# Patient Record
(demographics unavailable — no encounter records)

---

## 2020-10-22 NOTE — REPVR
PROCEDURE INFORMATION: 

Exam: MR Head Without Contrast 

Exam date and time: 10/22/2020 7:44 PM 

Age: 60 years old 

Clinical indication: Dizziness; Additional info: CVA 



TECHNIQUE: 

Imaging protocol: MR of the head without contrast. 



COMPARISON: 

CT Head without contrast 10/22/2020 4:20 PM 



FINDINGS: 

Brain: No restricted diffusion to suggest acute infarction. No acute 

intracranial hemorrhage mass or mass effect. No prior microhemorrhages. No 

white matter lesions. 

Cerebral ventricles: No ventriculomegaly. 

Bones/joints: Unremarkable. 

Paranasal sinuses: Normal as visualized. No acute sinusitis. 

Mastoid air cells: Normal as visualized. No mastoid effusion. 

Orbits: Unremarkable. 

Soft tissues: Unremarkable. 



IMPRESSION: 

No acute cerebral infarction or intracranial hemorrhage. 



Electronically signed by: Minerva Ramirez On 10/22/2020  20:36:59 PM

## 2020-10-22 NOTE — IPNPDOC
Text Note


Date of Service


The patient was seen on 10/22/20.





NOTE


TIME OF SERVICE 920PM





Mr. Patton is a 60 yr old w a hx of pre-DM, neuropathy, dyslipidemia, PAULINE and 

HTN who presented w c/o dizziness, sensation that the room is spinning, unsteady

gait and vomiting that begun today. Orthostats were positive, imaging studies of

the brain were unremarkable and his symptoms didnt improve w meclizine or IVF. 

He will be admitted for observation, while we titrate his meclizine and pending 

PT evaluation. 





Rest per 's H&P





VS,Fishbone, I+O


VS, Fishbone, I+O


Laboratory Tests


10/22/20 15:56








10/22/20 17:13











Vital Signs








  Date Time  Temp Pulse Resp B/P (MAP) Pulse Ox O2 Delivery O2 Flow Rate FiO2


 


10/22/20 17:30  83 18 131/74 (93) 96 Room Air  


 


10/22/20 15:20 97.0       

















JOSE SUERO MD                Oct 22, 2020 19:55

## 2020-10-22 NOTE — REPVR
PROCEDURE INFORMATION: 

Exam: XR Chest, 2 Views 

Exam date and time: 10/22/2020 4:16 PM 

Age: 60 years old 

Clinical indication: Other: Dizziness 



TECHNIQUE: 

Imaging protocol: XR of the chest 

Views: 2 views. 



COMPARISON: 

No relevant prior studies available. 



FINDINGS: 

Lungs: Unremarkable. No consolidation. 

Pleural space: Unremarkable. No pleural effusion. No pneumothorax. 

Heart/Mediastinum: Unremarkable. No cardiomegaly. 

Bones/joints: Unremarkable. 



IMPRESSION: 

No acute findings. 



Electronically signed by: Amor Flores On 10/22/2020  16:36:42 PM

## 2020-10-22 NOTE — REPVR
PROCEDURE INFORMATION: 

Exam: MR Angiogram Head Without Contrast, Arteries 

Exam date and time: 10/22/2020 7:44 PM 

Age: 60 years old 

Clinical indication: Dizziness and giddiness and vertigo; Additional info: CVA 



TECHNIQUE: 

Imaging protocol: MR angiogram head without contrast. Exam focused on the 

arteries. 



COMPARISON: 

CT Head without contrast 10/22/2020 4:20 PM 



FINDINGS: 



ANTERIOR CIRCULATION: 

Right internal carotid artery: Intracranial segment is patent with no 

significant stenosis. No aneurysm. 

Right middle cerebral artery: No occlusion or significant stenosis. No 

aneurysm. 

Right anterior cerebral artery: No occlusion or significant stenosis. No 

aneurysm. 



Left internal carotid artery: Intracranial segment is patent with no 

significant stenosis. No aneurysm. 

Left middle cerebral artery: No occlusion or significant stenosis. No aneurysm. 

Left anterior cerebral artery: No occlusion or significant stenosis. No 

aneurysm. 



POSTERIOR CIRCULATION: 

Right vertebral artery: No occlusion or significant stenosis. No aneurysm. 

Left vertebral artery:  Dominant left vertebral artery.

Basilar artery: No occlusion or significant stenosis. No aneurysm. 

Right posterior cerebral artery: No occlusion or significant stenosis. No 

aneurysm. 

Left posterior cerebral artery: No occlusion or significant stenosis. No 

aneurysm. 



IMPRESSION: 

No stenosis or occlusion. 



Electronically signed by: Minerva Ramirez On 10/22/2020  20:34:54 PM

## 2020-10-22 NOTE — REPVR
PROCEDURE INFORMATION: 

Exam: CT Head Without Contrast 

Exam date and time: 10/22/2020 4:16 PM 

Age: 60 years old 

Clinical indication: Dizziness 



TECHNIQUE: 

Imaging protocol: Computed tomography of the head without contrast. 

Radiation optimization: All CT scans at this facility use at least one of these 

dose optimization techniques: automated exposure control; mA and/or kV 

adjustment per patient size (includes targeted exams where dose is matched to 

clinical indication); or iterative reconstruction. 



COMPARISON: 

No relevant prior studies available. 



FINDINGS: 

Brain: No acute intracranial hemorrhage, cerebral edema, or midline shift. 

Cerebral ventricles: No hydrocephalus. 

Bones/joints: No acute fracture. 

Paranasal sinuses: There is no acute sinusitis. 

Mastoid air cells: Visualized mastoid air cells are well aerated. 

Orbital cavity: Unremarkable as visualized. 

Soft tissues: Unremarkable. 



IMPRESSION: 

No acute intracranial abnormality. 



Electronically signed by: Amor Flores On 10/22/2020  16:35:38 PM

## 2020-10-22 NOTE — HPEPDOC
[facility] Medical History & Physical


Date of Admission


Oct 22, 2020


Date of Service:  Oct 22, 2020


Attending Physician:  JOSE SUERO MD





History and Physical


CHIEF COMPLAINT: Dizziness 





HISTORY OF PRESENT ILLNESS: He is a 60-year-old male who presented to [facility] emergency department with complaint of dizziness. Patient states 

that around 7:30 this morning he had developed dizziness in which she felt the 

room was spinning. He stated that he sat down to see if it will go away however 

continued. He went to the bathroom where he developed some nausea and vomiting. 

He states that he has been unable to keep anything down due to the dizziness and

nausea. He denies any fevers chills abdominal pain diarrhea or constipation. He 

denies any changes in his gait with exception to feeling off balance due to the 

dizziness. He denies any weakness in any of his extremities. He denies any slurr

ing of his speech. He denies any confusion. Patient does admit to a history of 

migraines. He states that he sometimes develops a burning feeling in his left 

eye. He has been evaluated by ophthalmology in the past and was told he has dry 

eyes. Currently he denies any issue with his left eye. He denies any diploplia. 

He does state that when he was dizzy it was hard to focus but denies any 

limitations to sight currently. 





In the ER the patient was found to be vitally stable. He had positive orthos

tatic vital signs. He was given a 1L bolus. Additionally 50mg meclizine with 

some improvement in dizziness. Brain MRI and head CT found to be negative for 

any acute findings. Hospitalist service was consulted and the patient was 

admitted for further evaluation and management 





PAST MEDICAL HISTORY:


1. Hyperlipidemia 


2. Pre-Diabetes


3. Hypertension 


4. Obstructive Sleep Apnea noncompliant with CPAP


5. Chronic Lower Back Pain 


6. Migraine Headaches





PAST SURGICAL HISTORY:


1. Dental Surgery





SOCIAL HISTORY: Patient lives at home with his wife. He is a retired from the 

army. He is a former smoker and quit smoking 20 years ago. He denies any IV or 

illicit drug use. He states that he does not drink alcohol





FAMILY HISTORY: Patients father is . He had a history of diabetes 

mellitus type 2. His mother is alive and has a history of ASCVD with stroke 





ALLERGIES: Please see below.





REVIEW OF SYSTEMS:


CONSTITUTIONAL: Denies fevers. Admits to occasional chills. Denies unintentional

weight loss or weight gain. Denies night sweats


HEENT: Admits to occasional left eye dryness/burning. Admits to history of 

migraines. Denies dysphagia or odynophagia


CARDIOVASCULAR: Denies chest pain, palpitations or feelings of the heart racing


RESPIRATORY: Denies shortness of breath. Denies cough. Denies wheezing 


GASTROINTESTINAL: Admits to nausea and vomiting. Denies abdominal pain. Denies 

constipation or diarrhea 


GENITOURINARY: Denies dysuria, increased frequency or urgency


SKIN: Denies rashes or lesions 


MUSCULOSKELETAL: Denies muscle weakness. States he has difficulty with his 

balance when he gets dizzy spells 


NEUROLOGICAL: Admits to feeling dizzy. Denies changes in speech. Denies muscle 

weakness. Denies facial droop. Denies confusion 


PSYCHIATRIC: Denies depression or anxiety 


ENDOCRINE: Denies heat intolerance or cold intolerance 


HEMATOLOGIC/LYMPHATIC: Denies history of easy bruising or bleeding. Denies 

history of DVT or PE 





HOME MEDICATIONS: Please see below. 





PHYSICAL EXAMINATION:


VITAL SIGNS: Temperature 97.0, pulse 81, respiratory rate 18, blood pressure 

150/75, pulse oximetry 95% on room air.


GENERAL APPEARANCE: Patient is awake, alert, and oriented. He is lying in bed 

comfortably. He does not appear to be in any acute distress 


HEENT: Atraumatic, normocephalic. Eyes are nonicteric. There is no scleral 

ejection. Mucous membranes are slightly dry


CARDIOVASCULAR:. Normal S1, S2, regular rate and rhythm. No clicks, rubs or 

murmurs.


LUNGS:. Clear vesicular breath sounds bilaterally. Good respiratory effort. No 

wheezes, rhonchi or rales


ABDOMEN:. Soft, nondistended, nontender, no rebound tenderness or guarding. 

Normoactive bowel sounds.


MUSCULOSKELETAL: 5 out of 5 muscle strength testing in bilateral upper and lower

extremities


EXTREMITIES: No edema. Full equal pulses bilaterally in upper and lower 

extremities


NEUROLOGICAL: No focal neurological deficits. CN II-XII grossly intact. 

Cerebellar testing is normal. No nystagmus noted.


PSYCHIATRIC: Mood and affect appear appropriate for situation.





LABORATORY DATA: See below.





IMAGING: 





PROCEDURE INFORMATION: 


Exam: CT Head Without Contrast 


Exam date and time: 10/22/2020 4:16 PM 


Age: 60 years old 


Clinical indication: Dizziness 





TECHNIQUE: 


Imaging protocol: Computed tomography of the head without contrast. 


Radiation optimization: All CT scans at this facility use at least one of these 


dose optimization techniques: automated exposure control; mA and/or kV 


adjustment per patient size (includes targeted exams where dose is matched to 


clinical indication); or iterative reconstruction. 





COMPARISON: 


No relevant prior studies available. 





FINDINGS: 


Brain: No acute intracranial hemorrhage, cerebral edema, or midline shift. 


Cerebral ventricles: No hydrocephalus. 


Bones/joints: No acute fracture. 


Paranasal sinuses: There is no acute sinusitis. 


Mastoid air cells: Visualized mastoid air cells are well aerated. 


Orbital cavity: Unremarkable as visualized. 


Soft tissues: Unremarkable. 





IMPRESSION: 


No acute intracranial abnormality. 





Electronically signed by: Amor Flores On 10/22/2020  16:35:38 PM





PROCEDURE INFORMATION: 


Exam: XR Chest, 2 Views 


Exam date and time: 10/22/2020 4:16 PM 


Age: 60 years old 


Clinical indication: Other: Dizziness 





TECHNIQUE: 


Imaging protocol: XR of the chest 


Views: 2 views. 





COMPARISON: 


No relevant prior studies available. 





FINDINGS: 


Lungs: Unremarkable. No consolidation. 


Pleural space: Unremarkable. No pleural effusion. No pneumothorax. 


Heart/Mediastinum: Unremarkable. No cardiomegaly. 


Bones/joints: Unremarkable. 





IMPRESSION: 


No acute findings. 





Electronically signed by: Amor Flores On 10/22/2020  16:36:42 PM





PROCEDURE INFORMATION: 


Exam: MR Head Without Contrast 


Exam date and time: 10/22/2020 7:44 PM 


Age: 60 years old 


Clinical indication: Dizziness; Additional info: CVA 





TECHNIQUE: 


Imaging protocol: MR of the head without contrast. 





COMPARISON: 


CT Head without contrast 10/22/2020 4:20 PM 





FINDINGS: 


Brain: No restricted diffusion to suggest acute infarction. No acute 


intracranial hemorrhage mass or mass effect. No prior microhemorrhages. No 


white matter lesions. 


Cerebral ventricles: No ventriculomegaly. 


Bones/joints: Unremarkable. 


Paranasal sinuses: Normal as visualized. No acute sinusitis. 


Mastoid air cells: Normal as visualized. No mastoid effusion. 


Orbits: Unremarkable. 


Soft tissues: Unremarkable. 





IMPRESSION: 


No acute cerebral infarction or intracranial hemorrhage. 





Electronically signed by: Minerva Ramirez On 10/22/2020  20:36:59 PM





PROCEDURE INFORMATION: 


Exam: MR Angiogram Head Without Contrast, Arteries 


Exam date and time: 10/22/2020 7:44 PM 


Age: 60 years old 


Clinical indication: Dizziness and giddiness and vertigo; Additional info: CVA 





TECHNIQUE: 


Imaging protocol: MR angiogram head without contrast. Exam focused on the 


arteries. 





COMPARISON: 


CT Head without contrast 10/22/2020 4:20 PM 





FINDINGS: 





ANTERIOR CIRCULATION: 


Right internal carotid artery: Intracranial segment is patent with no 


significant stenosis. No aneurysm. 


Right middle cerebral artery: No occlusion or significant stenosis. No 


aneurysm. 


Right anterior cerebral artery: No occlusion or significant stenosis. No 


aneurysm. 





Left internal carotid artery: Intracranial segment is patent with no 


significant stenosis. No aneurysm. 


Left middle cerebral artery: No occlusion or significant stenosis. No aneurysm. 


Left anterior cerebral artery: No occlusion or significant stenosis. No 


aneurysm. 





POSTERIOR CIRCULATION: 


Right vertebral artery: No occlusion or significant stenosis. No aneurysm. 


Left vertebral artery:  Dominant left vertebral artery.


Basilar artery: No occlusion or significant stenosis. No aneurysm. 


Right posterior cerebral artery: No occlusion or significant stenosis. No 


aneurysm. 


Left posterior cerebral artery: No occlusion or significant stenosis. No 


aneurysm. 





IMPRESSION: 


No stenosis or occlusion. 





Electronically signed by: Minerva Ramirez On 10/22/2020  20:34:54 PM








MICROBIOLOGY: Please see below. 





ASSESSMENT: Patient is a 60-year-old male who presented to [facility] emergency department complaining of dizziness that started this morning. 

He received CT imaging of the head and brain MRI, which was negative. Patient 

was given meclizine for suspected vertigo.


.


PLAN:


1. Dizziness likely 2/2 Vertigo/BPPV


   -Patient presented with complaint of dizziness that started suddenly this 

morning. Developed nausea and vomiting. Denies any auditory changes. 


   -DDX includes: BPPV, Vertebrobasilar insufficiency, vestibular 

migraine/vertebrobasilar migraine, CVA.


   -CT imaging and Brain MRI completed. No acute findings. Patients symptoms 

likely not 2/2 CVA or posterior circulation insufficiency. Likely 2/2 BPPV


   -Patient given Meclizine in ER.


   -Home medications reviewed. No medications with associated ototoxicity. 


   -Will continue Meclizine 


   -Zofran PRN for nausea and vomiting 


   -Will monitor electrolytes due to ongoing nausea/vomiting





2. Orthostatic Hypotension 


   -Patient noted to have positive orthostatic vital signs in the ED. Likely 2/2

to dehydration from nausea/vomiting and decreased oral intake 


   -IV bolus given in ER. Will run maintenance fluids overnight until able to 

tolerate PO intake 





3. Hypertension 


   -On no medications outpatient. If persistent will start Norvasc 5 mg 





4. PAULINE 


   -Patient has PAULINE. He has recently been noncompliant with his CPAP. 


   -Oxygen Therapy orders for O2 sats 88-92% 





5. GERD 


   -Continue Prilosec





6. Left eye burning/dry eyes 


   -Patient has history of left eye burning. States he was told by opthomologist

that he has dry eyes. Currently no burning. No scleral injection. 


   -Artifical Tears PRN for left eye dryness 





7. Hyperlipidemia 


   -Continue Atorvastatin 





8. Chronic Lower Back Pain 


   -Continue Gabapentin 





9. DVT Prophylaxis 


   -Heparin SQ





Vital Signs





Vital Signs








  Date Time  Temp Pulse Resp B/P (MAP) Pulse Ox O2 Delivery O2 Flow Rate FiO2


 


10/22/20 18:45  87 18 150/79 (102) 97 Room Air  


 


10/22/20 15:20 97.0       











Laboratory Data


Labs 24H


Laboratory Tests 2


10/22/20 15:56: 


Immature Granulocyte % (Auto) 0.4, Neutrophils (%) (Auto) 89.7H, Lymphocytes (%)

(Auto) 5.8L, Monocytes (%) (Auto) 3.8, Eosinophils (%) (Auto) 0.1, Basophils (%)

(Auto) 0.2, Neutrophils # (Auto) 12.2H, Lymphocytes # (Auto) 0.8L, Monocytes # 

(Auto) 0.5, Eosinophils # (Auto) 0.0, Basophils # (Auto) 0.0, Nucleated Red 

Blood Cells % (auto) 0.0, Prothrombin Time 12.5, Prothromb Time International 

Ratio 0.92, Activated Partial Thromboplast Time 23.5L


10/22/20 17:13: 


Anion Gap 4L, Glomerular Filtration Rate > 60.0, Calcium Level 8.7L, Total 

Bilirubin 0.5, Aspartate Amino Transf (AST/SGOT) 17, Alanine Aminotransferase 

(ALT/SGPT) 28, Alkaline Phosphatase 104, Total Creatine Kinase 133, Creatine 

Kinase MB 2.0, Creatine Kinase MB Relative Index 1.50, Troponin I < 0.02, Total 

Protein 7.0, Albumin 3.7, Albumin/Globulin Ratio 1.1, Thyroid Stimulating 

Hormone (TSH) 0.598, Free Thyroxine 1.12


CBC/BMP


Laboratory Tests


10/22/20 15:56








10/22/20 17:13











Home Medications


Scheduled


Aspirin (Aspirin EC) 81 Mg Tablet.dr, 81 MG PO QHS


Atorvastatin Calcium (Atorvastatin Calcium) 80 Mg Tablet, 80 MG PO QPM


Gabapentin (Gabapentin) 600 Mg Tablet, 600 MG PO QHS


Metformin HCl (Metformin HCl ER) 500 Mg Tab.er.24h, 1,000 MG PO QPM





Scheduled PRN


Omeprazole (Omeprazole) 20 Mg Capsule.dr, 20 MG PO DAILY PRN for HEARTBURN





Allergies


Coded Allergies:  


     No Known Allergies (Unverified , 10/22/20)





A-FIB/CHADSVASC


A-FIB History


Current/History of A-Fib/PAF?:  No





GME ATTESTATION


GME ATTESTATION


My faculty preceptor for this patient encounter was physically present during 

the encounter and was fully available. All aspects of the patient interview, 

examination, medical decision making process, and medical care plan development 

were reviewed and approved by the faculty preceptor. The faculty preceptor is 

aware and concurs with the plan as stated in the body of this note and will 

attest to such by his/her cosignature.





ATTENDING NOTE


I reviewed and agree with the H&P as documented. Please see my addendum from 

10/22/20











SILKE ZALDIVAR DO            Oct 22, 2020 22:26


JOSE SUERO MD                Oct 23, 2020 03:40

## 2020-10-23 NOTE — ECGEPIP
[facility] - ED

                                       

                                       Test Date:    2020-10-22

Pat Name:     KEVYN SANCHEZ           Department:   

Patient ID:   [id number]                 Room:         -

Gender:       Male                     Technician:   JLIONEL

:          1960               Requested By: KATTY MAHMOOD

Order Number: [id number]     Reading MD:   Ximena Rankin

                                 Measurements

Intervals                              Axis          

Rate:         84                       P:            40

MO:           127                      QRS:          44

QRSD:         94                       T:            57

QT:           385                                    

QTc:          456                                    

                           Interpretive Statements

SINUS RHYTHM

LOW VOLTAGE LIMB 

NSTTW abnormalities

NO PRIOR

Electronically Signed on 10- 20:22:26 EDT by Ximena Rankin

## 2020-10-23 NOTE — DS.PDOC
Discharge Summary


General


Date of Admission


Oct 22, 2020 at 19:49


Date of Discharge


10/23/20


Attending Physician:  Paula Hernandez MD





Discharge Summary


HISTORY OF PRESENT ILLNESS: He is a 60-year-old male who presented to [facility] emergency department with complaint of dizziness. Patient states 

that around 7:30 this morning he had developed dizziness in which she felt the 

room was spinning. He stated that he sat down to see if it will go away however 

continued. He went to the bathroom where he developed some nausea and vomiting. 

He states that he has been unable to keep anything down due to the dizziness and

nausea. He denies any fevers chills abdominal pain diarrhea or constipation. He 

denies any changes in his gait with exception to feeling off balance due to the 

dizziness. He denies any weakness in any of his extremities. He denies any 

slurring of his speech. He denies any confusion. Patient does admit to a history

of migraines. He states that he sometimes develops a burning feeling in his left

eye. He has been evaluated by ophthalmology in the past and was told he has dry 

eyes. Currently he denies any issue with his left eye. He denies any diploplia. 

He does state that when he was dizzy it was hard to focus but denies any 

limitations to sight currently. 





In the ER the patient was found to be vitally stable. He had positive 

orthostatic vital signs. He was given a 1L bolus. Additionally 50mg meclizine 

with some improvement in dizziness. Brain MRI and head CT found to be negative 

for any acute findings. Hospitalist service was consulted and the patient was 

admitted for further evaluation and management 





HOSPITAL COURSE: 


MRA neg. Patient was further evaluated by physical therapy who performed 

vestibular therapy on him with minor improvement. He was started on meclizine 50

mg twice a day which did help his dizziness. Upon recommendation by physical 

therapy he was recommended to follow-up with her outpatient clinic to continue 

vestibular therapy. The patient also complained of a headache and was advised 

upon returning home to take sumatriptan which is his home medication. By midday 

on 10/23/2020 the patient was much improved and wanted to go home. He was made 

an appointment with both vestibular therapy as outpatient and primary care. The 

time of discharge he had only a mild headache with no dizziness and denied chest

pain, shortness of breath, fever, chills, nausea or vomiting.





PAST MEDICAL HISTORY:


1. Hyperlipidemia 


2. Pre-Diabetes


3. Hypertension 


4. Obstructive Sleep Apnea noncompliant with CPAP


5. Chronic Lower Back Pain 


6. Migraine Headaches





PAST SURGICAL HISTORY:


1. Dental Surgery





SOCIAL HISTORY: Patient lives at home with his wife. He is a retired from the 

army. He is a former smoker and quit smoking 20 years ago. He denies any IV or 

illicit drug use. He states that he does not drink alcohol





FAMILY HISTORY: Patients father is . He had a history of diabetes 

mellitus type 2. His mother is alive and has a history of ASCVD with stroke 





DISCHARGE MEDICATIONS: 


Please see below.





PHYSICAL EXAMINATION: 


VS: Please see below 


CONSTITUTIONAL: No acute distress, resting comfortably, AAO x 3


EYES: PERRLA, EOM intact


HENT, MOUTH: Normocephalic, atraumatic, moist mucous membranes, 


NECK: SUPPLE, no JVD, no lymphadenopathy, no carotid bruit


CV: Regular rate and rhythm, S1S2 normal, no murmurs/rubs/gallops


RESPIRATORY: Clear to auscultation bilaterally, no rales/rhonchi/wheezes


GI:  BS positive in 4 quadrants, soft, nontender, nondistended, no rebound or 

guarding, no organomegaly


: Deferred


MUSCULOSKELETAL: Normal ROM. No cyanosis, clubbing, swelling, joint deformity, 

extremity edema


INTEGUMENTARY:  Intact, no rashes, no lesions, no erythema


NEUROLOGIC: Cranial Nerves II-XII are intact, no focal deficits


PSYCHIATRIC: Mood and affect are normal 





LABORATORY DATA: 


Please see below 





IMAGING: 





CT Head Without Contrast 


No acute intracranial abnormality. 





CXR: 


No acute findings. 





CT Head without contrast 10/22/2020 4:20 PM 


No acute cerebral infarction or intracranial hemorrhage. 





MRA Head Without Contrast, Arteries:


No stenosis or occlusion. 





MICROBIOLOGY: Please see below. 





ASSESSMENT: Patient is a 60-year-old male who presented to [facility] emergency department complaining of dizziness and admitted for likely 

BPPV. 


.


PLAN:


1. Dizziness likely 2/2 Vertigo/BPPV


   -slightly improved with maneuvers done by PT and meclizine. 


   -Imaging above 


   -D/[place]rie home with vestibular therapy referral, meclizine PRN, f/u with PCP 





2. Orthostatic Hypotension likely 2/2 to dehydration 


   -resolved with IVF


   - Encouraged to drink at least 120 ounces of water daily 





3. Hypertension 


   -Stable 





4. PAULINE 


   -Patient has PAULINE. He has recently been noncompliant with his CPAP. 





5. GERD 


   -Continue Prilosec





6. Left eye burning/dry eyes 


   -Patient has history of left eye burning. States he was told by opthomologist

that he has dry eyes. Currently no burning. No scleral injection. 





7. Hyperlipidemia 


   -Continue Atorvastatin 





8. Chronic Lower Back Pain 


   -Continue Gabapentin 








DISPOSITION: Discharged home with vestibular therapy referral and meclizine PRN.

F/u with PCP within 1-2 weeks after discharge. 








TIME SPENT ON DISCHARGE: Greater than 30 minutes.





Vital Signs/I&Os





Vital Signs








  Date Time  Temp Pulse Resp B/P (MAP) Pulse Ox O2 Delivery O2 Flow Rate FiO2


 


10/23/20 14:00 98.0 98 18 120/76 (91) 95 Room Air  














I&O- Last 24 Hours up to 6 AM 


 


 10/23/20





 06:00


 


Intake Total 1605 ml


 


Output Total 325 ml


 


Balance 1280 ml











Laboratory Data


Labs 24H


Laboratory Tests 2


10/23/20 05:55: 


Nucleated Red Blood Cells % (auto) 0.0, Anion Gap 5L, Glomerular Filtration Rate

> 60.0, Calcium Level 8.5L, Magnesium Level 2.1


CBC/BMP


Laboratory Tests


10/23/20 05:55











Discharge Medications


Scheduled


Aspirin (Aspirin EC) 81 Mg Tablet.dr, 81 MG PO QHS, (Reported)


Atorvastatin Calcium (Atorvastatin Calcium) 80 Mg Tablet, 80 MG PO QPM, 

(Reported)


Gabapentin (Gabapentin) 600 Mg Tablet, 600 MG PO QHS, (Reported)


Metformin HCl (Metformin HCl ER) 500 Mg Tab.er.24h, 1,000 MG PO QPM, (Reported)





Scheduled PRN


Meclizine HCl (Meclizine HCl) 25 Mg Tablet, 25 MG PO TIDP PRN for dizziness


Omeprazole (Omeprazole) 20 Mg Capsule.dr, 20 MG PO DAILY PRN for HEARTBURN, 

(Reported)





Allergies


Coded Allergies:  


     No Known Allergies (Unverified , 10/22/20)











Paula Hernandez MD            Oct 23, 2020 18:21